# Patient Record
Sex: FEMALE | Race: WHITE | NOT HISPANIC OR LATINO | Employment: FULL TIME | URBAN - METROPOLITAN AREA
[De-identification: names, ages, dates, MRNs, and addresses within clinical notes are randomized per-mention and may not be internally consistent; named-entity substitution may affect disease eponyms.]

---

## 2023-07-07 ENCOUNTER — APPOINTMENT (OUTPATIENT)
Dept: LAB | Facility: HOSPITAL | Age: 28
End: 2023-07-07
Payer: COMMERCIAL

## 2023-07-07 DIAGNOSIS — Z00.8 HEALTH EXAMINATION IN POPULATION SURVEYS: ICD-10-CM

## 2023-07-07 LAB
CHOLEST SERPL-MCNC: 133 MG/DL
HDLC SERPL-MCNC: 60 MG/DL
LDLC SERPL CALC-MCNC: 58 MG/DL (ref 0–100)
NONHDLC SERPL-MCNC: 73 MG/DL
TRIGL SERPL-MCNC: 73 MG/DL

## 2023-07-07 PROCEDURE — 80061 LIPID PANEL: CPT

## 2023-07-07 PROCEDURE — 36415 COLL VENOUS BLD VENIPUNCTURE: CPT

## 2023-07-07 PROCEDURE — 83036 HEMOGLOBIN GLYCOSYLATED A1C: CPT

## 2023-07-11 LAB
EST. AVERAGE GLUCOSE BLD GHB EST-MCNC: 111 MG/DL
HBA1C MFR BLD: 5.5 %

## 2023-07-28 DIAGNOSIS — M54.40 ACUTE BILATERAL LOW BACK PAIN WITH SCIATICA, SCIATICA LATERALITY UNSPECIFIED: ICD-10-CM

## 2023-07-28 DIAGNOSIS — Z30.011 ORAL CONTRACEPTIVE PRESCRIBED: ICD-10-CM

## 2023-07-28 DIAGNOSIS — F41.9 ANXIETY: ICD-10-CM

## 2023-07-28 DIAGNOSIS — Z30.41 ENCOUNTER FOR BIRTH CONTROL PILLS MAINTENANCE: ICD-10-CM

## 2023-07-31 DIAGNOSIS — M54.40 ACUTE BILATERAL LOW BACK PAIN WITH SCIATICA, SCIATICA LATERALITY UNSPECIFIED: Primary | ICD-10-CM

## 2023-07-31 DIAGNOSIS — F41.9 ANXIETY: ICD-10-CM

## 2023-07-31 RX ORDER — NORETHINDRONE 0.35 MG
1 KIT ORAL DAILY
Qty: 84 TABLET | Refills: 0 | Status: SHIPPED | OUTPATIENT
Start: 2023-07-31

## 2023-07-31 RX ORDER — DULOXETIN HYDROCHLORIDE 60 MG/1
60 CAPSULE, DELAYED RELEASE ORAL DAILY
Qty: 90 CAPSULE | Refills: 0 | Status: SHIPPED | OUTPATIENT
Start: 2023-07-31

## 2023-07-31 RX ORDER — DULOXETIN HYDROCHLORIDE 30 MG/1
30 CAPSULE, DELAYED RELEASE ORAL DAILY
Qty: 90 CAPSULE | Refills: 0 | OUTPATIENT
Start: 2023-07-31 | End: 2023-10-29

## 2023-07-31 NOTE — TELEPHONE ENCOUNTER
Requested medication(s) are due for refill today: Yes  Patient has already received a courtesy refill: No  Other reason request has been forwarded to provider: Pt wants to up dose

## 2023-09-06 ENCOUNTER — ANNUAL EXAM (OUTPATIENT)
Age: 28
End: 2023-09-06
Payer: COMMERCIAL

## 2023-09-06 VITALS
DIASTOLIC BLOOD PRESSURE: 62 MMHG | HEIGHT: 70 IN | WEIGHT: 171.4 LBS | BODY MASS INDEX: 24.54 KG/M2 | SYSTOLIC BLOOD PRESSURE: 112 MMHG

## 2023-09-06 DIAGNOSIS — Z01.419 ENCOUNTER FOR ANNUAL ROUTINE GYNECOLOGICAL EXAMINATION: Primary | ICD-10-CM

## 2023-09-06 PROBLEM — R76.11 FALSE POSITIVE PURIFIED PROTEIN DERIVATIVE (PPD) TEST: Status: RESOLVED | Noted: 2022-08-11 | Resolved: 2023-09-06

## 2023-09-06 PROCEDURE — S0612 ANNUAL GYNECOLOGICAL EXAMINA: HCPCS | Performed by: OBSTETRICS & GYNECOLOGY

## 2023-09-06 RX ORDER — DIPHENOXYLATE HYDROCHLORIDE AND ATROPINE SULFATE 2.5; .025 MG/1; MG/1
1 TABLET ORAL DAILY
COMMUNITY

## 2023-09-06 NOTE — PROGRESS NOTES
Assessment/Plan:     Encounter for annual routine gynecological examination    Preconceptual health discussed      Subjective      Asim Cuello is a 33 y.o. female who presents for annual exam. Periods have been erratic on norethindrone. Does report stress and weight loss. Considering conception fall 2024. Denies any breast, urinary or sexual concerns. Current contraception: oral progesterone-only contraceptive  History of abnormal Pap smear: no  Regular self breast exam: yes  History of abnormal mammogram: no  History of abnormal lipids: no    Menstrual History:  Nulligravida  Patient's last menstrual period was 09/03/2023 (exact date).   Period Cycle (Days):  (30-40)  Period Duration (Days): 3-5  Period Pattern: (!) Irregular  Menstrual Flow: Heavy, Moderate, Light (Heavy x 2 days)  Menstrual Control: Maxi pad, Tampon  Menstrual Control Change Freq (Hours): 4-6  Dysmenorrhea: (!) Severe  Dysmenorrhea Symptoms: Cramping, Headache, Nausea (back , leg pain)    Past Medical History:   Diagnosis Date   • Asthma early childhood   • Headache    • Headache(784.0) childhood    weather and allergy season dependant   • Migraine 2005    Occasional R/T weather, menstrual cycle, stress       Family History   Problem Relation Age of Onset   • Asthma Mother    • Pulmonary embolism Mother    • Thyroid disease Mother         hypothyroidism   • Thrombosis Mother         PE with unknown origin, late 35s   • Hyperlipidemia Father    • Hypertension Father    • Diabetes Maternal Grandmother    • Mental illness Maternal Grandmother         Bipolar disorder   • Heart disease Maternal Grandmother         MI 8/2021, [facility]; diagnosed w/ HF   • Thyroid disease Maternal Grandmother         hypothyroidism   • Bipolar disorder Maternal Grandmother    • Diabetes Maternal Grandfather    • Alcohol abuse Maternal Grandfather    • No Known Problems Brother    • Dementia Paternal Grandmother    • Alcohol abuse Paternal Grandfather    • No Known Problems Brother    • No Known Problems Brother    • Mental illness Maternal Aunt         Anxiety and depression   • Thyroid disease Maternal Aunt         hypothyroidism   • Anxiety disorder Maternal Aunt        The following portions of the patient's history were reviewed and updated as appropriate: allergies, current medications, past family history, past medical history, past social history, past surgical history and problem list.    Review of Systems  Pertinent items are noted in HPI. Objective      /62 (BP Location: Right arm, Patient Position: Sitting, Cuff Size: Standard)   Ht 5' 10.25" (1.784 m)   Wt 77.7 kg (171 lb 6.4 oz)   LMP 09/03/2023 (Exact Date)   BMI 24.42 kg/m²     General:   alert and oriented, in no acute distress   Heart:  Breasts: regular rate and rhythm   appear normal, no suspicious masses, no skin or nipple changes or axillary nodes.    Lungs: effort normal   Abdomen: soft, non-tender, without masses or organomegaly   Vulva: normal   Vagina: normal mucosa; moderate menstrual blood   Cervix: no lesions   Uterus: normal size, mobile, non-tender   Adnexa: normal adnexa and no mass, fullness, tenderness

## 2023-09-11 ENCOUNTER — OFFICE VISIT (OUTPATIENT)
Dept: FAMILY MEDICINE CLINIC | Facility: CLINIC | Age: 28
End: 2023-09-11
Payer: COMMERCIAL

## 2023-09-11 VITALS
WEIGHT: 169 LBS | BODY MASS INDEX: 24.2 KG/M2 | DIASTOLIC BLOOD PRESSURE: 84 MMHG | HEIGHT: 70 IN | TEMPERATURE: 97.6 F | SYSTOLIC BLOOD PRESSURE: 132 MMHG

## 2023-09-11 DIAGNOSIS — Z00.00 ANNUAL PHYSICAL EXAM: Primary | ICD-10-CM

## 2023-09-11 DIAGNOSIS — G89.29 CHRONIC BILATERAL LOW BACK PAIN WITHOUT SCIATICA: ICD-10-CM

## 2023-09-11 DIAGNOSIS — F41.9 ANXIETY: ICD-10-CM

## 2023-09-11 DIAGNOSIS — M54.50 CHRONIC BILATERAL LOW BACK PAIN WITHOUT SCIATICA: ICD-10-CM

## 2023-09-11 PROCEDURE — 99395 PREV VISIT EST AGE 18-39: CPT | Performed by: FAMILY MEDICINE

## 2023-09-11 RX ORDER — DULOXETIN HYDROCHLORIDE 30 MG/1
30 CAPSULE, DELAYED RELEASE ORAL DAILY
Qty: 60 CAPSULE | Refills: 0 | Status: SHIPPED | OUTPATIENT
Start: 2023-09-11

## 2023-09-11 NOTE — PROGRESS NOTES
Name: Jacqueline Long      : 1995      MRN: [id number]  Encounter Provider: Rod Russo MD  Encounter Date: 2023   Encounter department: [facility] AT [address] [place] [address]     1. Annual physical exam    2. Chronic bilateral low back pain without sciatica  -     DULoxetine (Cymbalta) 30 mg delayed release capsule; Take 1 capsule (30 mg total) by mouth daily    3. Anxiety  -     DULoxetine (Cymbalta) 30 mg delayed release capsule; Take 1 capsule (30 mg total) by mouth daily      Anxiety and back pain controlled. She would like to trial off medication as she is planning on having children next year. We will start Cymbalta taper. Decrease Cymbalta from 60 mg to 30 mg daily for 2 to 4 weeks then taper as tolerated. Subjective      Presents to the office for annual exam.  Has history of chronic back pain anxiety on Cymbalta. Would like to taper off Cymbalta. Her anxiety and back pain are well controlled. She like to continue for another month to see her through her board exam but then would like to start to taper. She is planning on having children starting next year. Review of Systems   All other systems reviewed and are negative.       Current Outpatient Medications on File Prior to Visit   Medication Sig   • albuterol (Ventolin HFA) 90 mcg/act inhaler Inhale 2 puffs every 6 (six) hours as needed for wheezing   • ibuprofen (MOTRIN) 600 mg tablet Take 1 tablet (600 mg total) by mouth every 12 (twelve) hours X 4 days, then q 8 hours prn   • multivitamin (THERAGRAN) TABS Take 1 tablet by mouth daily   • ondansetron (ZOFRAN) 4 mg tablet Take 1 tablet (4 mg total) by mouth every 8 (eight) hours as needed for nausea or vomiting   • Sharobel 0.35 MG tablet Take 1 tablet (0.35 mg total) by mouth daily   • Ubiquinol 100 MG CAPS Take by mouth   • [DISCONTINUED] DULoxetine (CYMBALTA) 60 mg delayed release capsule Take 1 capsule (60 mg total) by mouth daily   • [DISCONTINUED] meloxicam (Mobic) 15 mg tablet Take 0.5 tablets (7.5 mg total) by mouth daily as needed for mild pain   • [DISCONTINUED] methocarbamol (ROBAXIN) 500 mg tablet Take 1 tablet (500 mg total) by mouth daily at bedtime as needed for muscle spasms       Objective     /84 (BP Location: Left arm, Patient Position: Sitting, Cuff Size: Standard)   Temp 97.6 °F (36.4 °C) (Tympanic)   Ht 5' 10" (1.778 m)   Wt 76.7 kg (169 lb)   LMP 09/03/2023 (Exact Date)   BMI 24.25 kg/m²     Physical Exam  Vitals and nursing note reviewed. Constitutional:       General: She is not in acute distress. Appearance: Normal appearance. She is not ill-appearing, toxic-appearing or diaphoretic. HENT:      Head: Normocephalic and atraumatic. Right Ear: External ear normal.      Left Ear: External ear normal.      Nose: No congestion or rhinorrhea. Mouth/Throat:      Mouth: Mucous membranes are moist.      Pharynx: Oropharynx is clear. No oropharyngeal exudate or posterior oropharyngeal erythema. Eyes:      General:         Right eye: No discharge. Left eye: No discharge. Extraocular Movements: Extraocular movements intact. Conjunctiva/sclera: Conjunctivae normal.      Pupils: Pupils are equal, round, and reactive to light. Cardiovascular:      Rate and Rhythm: Normal rate and regular rhythm. Pulses: Normal pulses. Heart sounds: Normal heart sounds. No murmur heard. Pulmonary:      Effort: Pulmonary effort is normal. No respiratory distress. Breath sounds: Normal breath sounds. Abdominal:      General: Abdomen is flat. Bowel sounds are normal. There is no distension. Palpations: There is no mass. Musculoskeletal:         General: Normal range of motion. Cervical back: Normal range of motion and neck supple. Right lower leg: No edema. Left lower leg: No edema. Skin:     General: Skin is warm and dry.       Capillary Refill: Capillary refill takes less than 2 seconds. Coloration: Skin is not jaundiced or pale. Findings: No bruising, erythema, lesion or rash. Neurological:      General: No focal deficit present. Mental Status: She is alert and oriented to person, place, and time. Cranial Nerves: No cranial nerve deficit. Sensory: No sensory deficit. Motor: No weakness. Coordination: Coordination normal.      Gait: Gait normal.      Deep Tendon Reflexes: Reflexes normal.   Psychiatric:         Mood and Affect: Mood normal.         Behavior: Behavior normal.         Thought Content:  Thought content normal.         Judgment: Judgment normal.       Prisca Khalil MD

## 2023-11-02 DIAGNOSIS — Z30.41 ENCOUNTER FOR BIRTH CONTROL PILLS MAINTENANCE: ICD-10-CM

## 2023-11-02 DIAGNOSIS — Z30.011 ORAL CONTRACEPTIVE PRESCRIBED: ICD-10-CM

## 2023-11-02 RX ORDER — NORETHINDRONE 0.35 MG
1 KIT ORAL DAILY
Qty: 84 TABLET | Refills: 0 | Status: SHIPPED | OUTPATIENT
Start: 2023-11-02